# Patient Record
Sex: MALE | Race: BLACK OR AFRICAN AMERICAN | Employment: FULL TIME | URBAN - METROPOLITAN AREA
[De-identification: names, ages, dates, MRNs, and addresses within clinical notes are randomized per-mention and may not be internally consistent; named-entity substitution may affect disease eponyms.]

---

## 2022-11-05 ENCOUNTER — HOSPITAL ENCOUNTER (EMERGENCY)
Age: 22
Discharge: HOME | End: 2022-11-06
Attending: EMERGENCY MEDICINE
Payer: COMMERCIAL

## 2022-11-05 VITALS
HEART RATE: 87 BPM | HEIGHT: 77 IN | BODY MASS INDEX: 25.36 KG/M2 | DIASTOLIC BLOOD PRESSURE: 80 MMHG | TEMPERATURE: 98.6 F | RESPIRATION RATE: 18 BRPM | SYSTOLIC BLOOD PRESSURE: 129 MMHG | OXYGEN SATURATION: 98 % | WEIGHT: 214.8 LBS

## 2022-11-05 DIAGNOSIS — M79.641 BILATERAL HAND PAIN: Primary | ICD-10-CM

## 2022-11-05 DIAGNOSIS — M79.642 BILATERAL HAND PAIN: Primary | ICD-10-CM

## 2022-11-05 PROCEDURE — 6370000000 HC RX 637 (ALT 250 FOR IP): Performed by: EMERGENCY MEDICINE

## 2022-11-05 PROCEDURE — 99283 EMERGENCY DEPT VISIT LOW MDM: CPT

## 2022-11-05 RX ORDER — NAPROXEN 500 MG/1
500 TABLET ORAL ONCE
Status: COMPLETED | OUTPATIENT
Start: 2022-11-05 | End: 2022-11-05

## 2022-11-05 RX ORDER — NAPROXEN 500 MG/1
500 TABLET ORAL 2 TIMES DAILY PRN
Qty: 20 TABLET | Refills: 0 | Status: SHIPPED | OUTPATIENT
Start: 2022-11-05

## 2022-11-05 RX ADMIN — NAPROXEN 500 MG: 500 TABLET ORAL at 23:21

## 2022-11-05 ASSESSMENT — PAIN DESCRIPTION - LOCATION
LOCATION: OTHER (COMMENT);ARM
LOCATION: ARM

## 2022-11-05 ASSESSMENT — PAIN SCALES - GENERAL
PAINLEVEL_OUTOF10: 8
PAINLEVEL_OUTOF10: 8

## 2022-11-05 ASSESSMENT — PAIN DESCRIPTION - ORIENTATION
ORIENTATION: LEFT;RIGHT
ORIENTATION: RIGHT;LEFT

## 2022-11-05 ASSESSMENT — PAIN DESCRIPTION - DESCRIPTORS
DESCRIPTORS: TINGLING;BURNING
DESCRIPTORS: BURNING;TINGLING

## 2022-11-05 ASSESSMENT — PAIN DESCRIPTION - FREQUENCY: FREQUENCY: CONTINUOUS

## 2022-11-05 ASSESSMENT — PAIN DESCRIPTION - PAIN TYPE: TYPE: ACUTE PAIN

## 2022-11-05 ASSESSMENT — PAIN DESCRIPTION - ONSET: ONSET: SUDDEN

## 2022-11-05 ASSESSMENT — PAIN - FUNCTIONAL ASSESSMENT: PAIN_FUNCTIONAL_ASSESSMENT: 0-10

## 2022-11-06 ASSESSMENT — ENCOUNTER SYMPTOMS
EYES NEGATIVE: 1
GASTROINTESTINAL NEGATIVE: 1
RESPIRATORY NEGATIVE: 1

## 2022-11-06 NOTE — DISCHARGE INSTRUCTIONS
The pain in both of your hands is likely due to strain of the tendons in your arms, though may also be related to an abnormality of your spine and your neck. I do recommend an MRI to better evaluate for this but you have elected this time to try symptomatic treatment and return if symptoms do not improve. Use wrist splints for comfort. Take Naprosyn as needed for pain. Return to the emergency department for weakness in the arm, loss of control or bowel or bladder, or any other concerns. Follow-up with a primary care provider of your choice for continued symptom management.

## 2022-11-06 NOTE — ED NOTES
Pt has d/c order. D/C instructions given. Prescriptions given. Pt verbalized understanding. Pt out to lobby.          Rhona Matthews RN  11/06/22 0004

## 2022-11-06 NOTE — ED PROVIDER NOTES
[id number] [facility]          ATTENDING PHYSICIAN NOTE       Date of evaluation: 11/5/2022    Chief Complaint     Other (Did big stretch with arms up and outward after shower around  and now numbness in bilateral thumbs and index fingers with shooting pains to biceps)      History of Present Illness     Oumou Aceves is a 20 y.o. male who presents to the emerge from and complaining of pain to the bilateral hands. Patient states that several hours prior to presentation he was stretching with his arms abducted and felt a popping sensation in his bilateral upper extremities. He states since then he has been having a sensation of burning and pins-and-needles to the bilateral thumbs and index fingers. He denies any weakness of the extremities. He denies any loss control of bowel or bladder. He denies any trauma. He has not tried taking anything for his symptoms. He denies ever having symptoms like this in the past.    Review of Systems     Review of Systems   Constitutional: Negative. HENT: Negative. Eyes: Negative. Respiratory: Negative. Cardiovascular: Negative. Gastrointestinal: Negative. Genitourinary: Negative. Musculoskeletal:  Positive for myalgias. Neurological: Negative. All other systems reviewed and are negative. Past Medical, Surgical, Family, and Social History     He has no past medical history on file. He has no past surgical history on file. His family history is not on file. He reports that he has never smoked. He uses smokeless tobacco. He reports current alcohol use. He reports current drug use. Drug: Marijuana [place]). Medications     Discharge Medication List as of 11/5/2022 11:56 PM          Allergies     He has No Known Allergies. Physical Exam     INITIAL VITALS: BP: 129/80, Temp: 98.6 °F (37 °C), Heart Rate: 87, Resp: 18, SpO2: 98 %   Physical Exam  Vitals and nursing note reviewed.    Constitutional: General: He is not in acute distress. Musculoskeletal:      Cervical back: Normal range of motion and neck supple. No tenderness. Comments: Patient complains of tenderness to palpation of the bilateral upper extremities in the area of the first and second digits. There is no color change of the skin. There is no erythema or warmth noted. Neurological:      Mental Status: He is alert. Comments: Patient describes decreased sensation of the bilateral upper extremities. He has a normal Spurling's test.       Diagnostic Results     RECENT VITALS:  BP: 129/80,Temp: 98.6 °F (37 °C), Heart Rate: 87, Resp: 18, SpO2: 98 %     Procedures     N/A    ED Course     Nursing Notes, Past Medical Hx, Past Surgical Hx, Social Hx,Allergies, and Family Hx were reviewed. patient was given the following medications:  Orders Placed This Encounter   Medications    naproxen (NAPROSYN) tablet 500 mg    naproxen (NAPROSYN) 500 MG tablet     Sig: Take 1 tablet by mouth 2 times daily as needed for Pain     Dispense:  20 tablet     Refill:  0       CONSULTS:  None    MEDICAL DECISIONMAKING / ASSESSMENT / Bettina Faith is a 20 y.o. male presents complaining of bilateral upper extremity pain and paresthesias. Patient states this started after doing a stretching exercise, though he has never had this in the past.  On arrival, patient does have tenderness to palpation of the bilateral first and second digits with sensation decreased on fine touch testing. He has a negative Spurling's test.  He has 2+ radial pulses bilaterally. I feel most likely patient's symptoms are secondary to bilateral tendinitis. 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   His distribution would be concerning for a lesion at the C7 level of the spinal cord and I did discuss with the patient obtaining an MRI to further investigate this, but patient stated he would not want to do this and instead wants to be treated symptomatically. He did have capacity to make decisions. Patient will be placed in bilateral wrist splints and will be written for naproxen for symptom relief. He will be instructed to return to the emergency department for any weakness, loss control of his bowel or bladder, or any other concerns. Clinical Impression     1.  Bilateral hand pain        Disposition     PATIENT REFERRED TO:  Primary care provider of your choice            DISCHARGE MEDICATIONS:  Discharge Medication List as of 11/5/2022 11:56 PM        START taking these medications    Details   naproxen (NAPROSYN) 500 MG tablet Take 1 tablet by mouth 2 times daily as needed for Pain, Disp-20 tablet, R-[id number]             DISPOSITION Decision To Discharge 11/05/2022 11:57:23 PM         Nahomy Cruz MD  11/06/22 [id number]